# Patient Record
Sex: MALE | Race: OTHER | Employment: OTHER | URBAN - METROPOLITAN AREA
[De-identification: names, ages, dates, MRNs, and addresses within clinical notes are randomized per-mention and may not be internally consistent; named-entity substitution may affect disease eponyms.]

---

## 2017-11-24 ENCOUNTER — HOSPITAL ENCOUNTER (INPATIENT)
Facility: HOSPITAL | Age: 72
LOS: 3 days | Discharge: HOME | DRG: 200 | End: 2017-11-28
Attending: EMERGENCY MEDICINE | Admitting: HOSPITALIST
Payer: COMMERCIAL

## 2017-11-24 ENCOUNTER — APPOINTMENT (OUTPATIENT)
Dept: GENERAL RADIOLOGY | Facility: HOSPITAL | Age: 72
DRG: 200 | End: 2017-11-24
Attending: EMERGENCY MEDICINE
Payer: COMMERCIAL

## 2017-11-24 ENCOUNTER — APPOINTMENT (OUTPATIENT)
Dept: CT IMAGING | Facility: HOSPITAL | Age: 72
DRG: 200 | End: 2017-11-24
Attending: EMERGENCY MEDICINE
Payer: COMMERCIAL

## 2017-11-24 ENCOUNTER — APPOINTMENT (OUTPATIENT)
Dept: CT IMAGING | Facility: HOSPITAL | Age: 72
DRG: 200 | End: 2017-11-24
Payer: COMMERCIAL

## 2017-11-24 DIAGNOSIS — S27.0XXA TRAUMATIC PNEUMOTHORAX, INITIAL ENCOUNTER: ICD-10-CM

## 2017-11-24 DIAGNOSIS — S22.41XA CLOSED FRACTURE OF MULTIPLE RIBS OF RIGHT SIDE, INITIAL ENCOUNTER: Primary | ICD-10-CM

## 2017-11-24 DIAGNOSIS — T79.7XXA SUBCUTANEOUS EMPHYSEMA DUE TO TRAUMA, INITIAL ENCOUNTER (HCC): ICD-10-CM

## 2017-11-24 PROCEDURE — 71250 CT THORAX DX C-: CPT | Performed by: EMERGENCY MEDICINE

## 2017-11-24 PROCEDURE — 71101 X-RAY EXAM UNILAT RIBS/CHEST: CPT | Performed by: EMERGENCY MEDICINE

## 2017-11-24 PROCEDURE — 70450 CT HEAD/BRAIN W/O DYE: CPT | Performed by: EMERGENCY MEDICINE

## 2017-11-24 PROCEDURE — 74176 CT ABD & PELVIS W/O CONTRAST: CPT | Performed by: EMERGENCY MEDICINE

## 2017-11-24 RX ORDER — MORPHINE SULFATE 4 MG/ML
4 INJECTION, SOLUTION INTRAMUSCULAR; INTRAVENOUS ONCE
Status: COMPLETED | OUTPATIENT
Start: 2017-11-24 | End: 2017-11-24

## 2017-11-24 RX ORDER — ONDANSETRON 2 MG/ML
4 INJECTION INTRAMUSCULAR; INTRAVENOUS ONCE
Status: COMPLETED | OUTPATIENT
Start: 2017-11-24 | End: 2017-11-24

## 2017-11-24 RX ORDER — ACETAMINOPHEN 500 MG
1000 TABLET ORAL ONCE
Status: COMPLETED | OUTPATIENT
Start: 2017-11-24 | End: 2017-11-24

## 2017-11-25 ENCOUNTER — APPOINTMENT (OUTPATIENT)
Dept: GENERAL RADIOLOGY | Facility: HOSPITAL | Age: 72
DRG: 200 | End: 2017-11-25
Attending: HOSPITALIST
Payer: COMMERCIAL

## 2017-11-25 PROBLEM — S27.0XXA TRAUMATIC PNEUMOTHORAX, INITIAL ENCOUNTER: Status: ACTIVE | Noted: 2017-11-25

## 2017-11-25 PROBLEM — S22.41XA MULTIPLE CLOSED FRACTURES OF RIBS OF RIGHT SIDE: Status: ACTIVE | Noted: 2017-11-25

## 2017-11-25 PROBLEM — T79.7XXA SUBCUTANEOUS EMPHYSEMA DUE TO TRAUMA, INITIAL ENCOUNTER (HCC): Status: ACTIVE | Noted: 2017-11-25

## 2017-11-25 PROBLEM — S22.41XA CLOSED FRACTURE OF MULTIPLE RIBS OF RIGHT SIDE, INITIAL ENCOUNTER: Status: ACTIVE | Noted: 2017-11-25

## 2017-11-25 PROCEDURE — 71010 XR CHEST AP/PA (1 VIEW) (CPT=71010): CPT | Performed by: HOSPITALIST

## 2017-11-25 PROCEDURE — 99254 IP/OBS CNSLTJ NEW/EST MOD 60: CPT | Performed by: INTERNAL MEDICINE

## 2017-11-25 PROCEDURE — 99233 SBSQ HOSP IP/OBS HIGH 50: CPT | Performed by: HOSPITALIST

## 2017-11-25 PROCEDURE — 99222 1ST HOSP IP/OBS MODERATE 55: CPT | Performed by: HOSPITALIST

## 2017-11-25 RX ORDER — HYDROCODONE BITARTRATE AND ACETAMINOPHEN 5; 325 MG/1; MG/1
2 TABLET ORAL EVERY 6 HOURS PRN
Status: DISCONTINUED | OUTPATIENT
Start: 2017-11-25 | End: 2017-11-28

## 2017-11-25 RX ORDER — SODIUM CHLORIDE AND POTASSIUM CHLORIDE .9; .15 G/100ML; G/100ML
SOLUTION INTRAVENOUS CONTINUOUS
Status: DISCONTINUED | OUTPATIENT
Start: 2017-11-25 | End: 2017-11-28

## 2017-11-25 RX ORDER — PANTOPRAZOLE SODIUM 40 MG/1
40 TABLET, DELAYED RELEASE ORAL
Status: DISCONTINUED | OUTPATIENT
Start: 2017-11-25 | End: 2017-11-28

## 2017-11-25 RX ORDER — HYDRALAZINE HYDROCHLORIDE 20 MG/ML
10 INJECTION INTRAMUSCULAR; INTRAVENOUS EVERY 4 HOURS PRN
Status: DISCONTINUED | OUTPATIENT
Start: 2017-11-25 | End: 2017-11-28

## 2017-11-25 RX ORDER — AMLODIPINE BESYLATE 10 MG/1
10 TABLET ORAL DAILY
Qty: 30 TABLET | Refills: 0 | Status: SHIPPED | OUTPATIENT
Start: 2017-11-26

## 2017-11-25 RX ORDER — LISINOPRIL 2.5 MG/1
2.5 TABLET ORAL DAILY
Status: DISCONTINUED | OUTPATIENT
Start: 2017-11-25 | End: 2017-11-26

## 2017-11-25 RX ORDER — ONDANSETRON 2 MG/ML
4 INJECTION INTRAMUSCULAR; INTRAVENOUS EVERY 6 HOURS PRN
Status: DISCONTINUED | OUTPATIENT
Start: 2017-11-25 | End: 2017-11-28

## 2017-11-25 RX ORDER — HYDROCODONE BITARTRATE AND ACETAMINOPHEN 5; 325 MG/1; MG/1
1 TABLET ORAL EVERY 6 HOURS PRN
Status: DISCONTINUED | OUTPATIENT
Start: 2017-11-25 | End: 2017-11-28

## 2017-11-25 RX ORDER — 0.9 % SODIUM CHLORIDE 0.9 %
VIAL (ML) INJECTION
Status: COMPLETED
Start: 2017-11-25 | End: 2017-11-25

## 2017-11-25 RX ORDER — KETOROLAC TROMETHAMINE 15 MG/ML
15 INJECTION, SOLUTION INTRAMUSCULAR; INTRAVENOUS EVERY 6 HOURS PRN
Status: DISPENSED | OUTPATIENT
Start: 2017-11-25 | End: 2017-11-27

## 2017-11-25 RX ORDER — ACETAMINOPHEN 325 MG/1
650 TABLET ORAL EVERY 6 HOURS PRN
Status: DISCONTINUED | OUTPATIENT
Start: 2017-11-25 | End: 2017-11-28

## 2017-11-25 RX ORDER — HYDROCODONE BITARTRATE AND ACETAMINOPHEN 5; 325 MG/1; MG/1
2 TABLET ORAL EVERY 6 HOURS PRN
Qty: 30 TABLET | Refills: 0 | Status: SHIPPED | OUTPATIENT
Start: 2017-11-25

## 2017-11-25 RX ORDER — POTASSIUM CHLORIDE 20 MEQ/1
40 TABLET, EXTENDED RELEASE ORAL EVERY 4 HOURS
Status: COMPLETED | OUTPATIENT
Start: 2017-11-25 | End: 2017-11-25

## 2017-11-25 RX ORDER — AMLODIPINE BESYLATE 10 MG/1
10 TABLET ORAL DAILY
Status: DISCONTINUED | OUTPATIENT
Start: 2017-11-25 | End: 2017-11-28

## 2017-11-25 NOTE — H&P
Noreen 11 Patient Status:  Emergency    10/15/1945 MRN [id number]   Location [address] Attending  E  Milton Meyer Day # 0 PCP None Pcp     Date: Exam:  Temp:  [97.7 °F (36.5 °C)] 97.7 °F (36.5 °C)  Pulse:  [68-80] 76  Resp:  [18-26] 23  BP: (137-190)/() 154/97    General:  Alert and oriented. Diffuse skin problem:  None.   Eye:  Pupils are equal, round and reactive to light, extraocular movem fracture  Use Vicodin as needed for pain, will use Lidoderm patch as well. Uncontrolled hypertension  Likely precipitated by pain, will treat pain appropriately, resume home medications.     Prophylaxis  Subcutaneous heparin    CODE STATUS  Full    Prima

## 2017-11-25 NOTE — ED INITIAL ASSESSMENT (HPI)
Pt came in for mechanical fall backward in the shower today. Reports right posterior rib pain and posterior head pain from hitting his head. Denies LOC, no blood thinners. Also reports ongoing cough.  RR even and nonlabored, speaking in full sentences, afeb

## 2017-11-25 NOTE — Clinical Note
Date: 11/24/2017  Patient: Mikael Borrego  Admitted: 11/24/2017  6:40 PM  Attending Provider: Joseph Giang*    Transfer to [place] was arranged to provide a higer level of care.  Attending physician at the receiving facility was in agreeme n

## 2017-11-25 NOTE — CM/SW NOTE
Declined by [facility] after several attempts of trying to reach a hospitalist but [employer] refused to give a number to the Hospitalist group that admits to their facility.  Spoke with Dr. Mark Ferraro and she was willing to admit to [place] but Vlad Hamilton

## 2017-11-25 NOTE — PROGRESS NOTES
Pt seen and examined. See H and P from Dr Cristofer Ruano from earlier today. Pain 5/10. Also with HTN uncontrolled. Will add norco and lisinopril for BP. Monitor overnight.      Kay Kumar  11/25/2017

## 2017-11-25 NOTE — CONSULTS
[facility]    Report of Consultation    Angela Sepulveda Patient Status:  Inpatient    10/15/1945 MRN [id number]   Location [facility] 3W/SW Attending Maryln Denver, [address] [facility] Day # 0 PCP None Pcp     Date of Admission:   admission.     Allergies  No Known Allergies    Review of Systems:   Constitutional: denies fevers, chills, weakness, fatigue, recent illness  HEENT: denies headache, sore throat, vision loss  Cardio: denies chest pain, chest pressure, palpitations  Respira 11/24/2017  CONCLUSION:  1. No acute findings. 2. 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 3. Age-related diffuse atrophy and chronic white matter ischemic changes.          Ct Chest+abdomen+pelvis(cpt=71250/23264)    Result Date: standpoint  -Reviewed vitals, labs and imaging    Marifer Avila DO  Pulmonary [address] [place] [address]  11/25/2017  10:00 AM

## 2017-11-26 ENCOUNTER — APPOINTMENT (OUTPATIENT)
Dept: GENERAL RADIOLOGY | Facility: HOSPITAL | Age: 72
DRG: 200 | End: 2017-11-26
Attending: HOSPITALIST
Payer: COMMERCIAL

## 2017-11-26 PROCEDURE — 99233 SBSQ HOSP IP/OBS HIGH 50: CPT | Performed by: HOSPITALIST

## 2017-11-26 PROCEDURE — 74000 XR ABDOMEN (KUB) (1 AP VIEW)  (CPT=74000): CPT | Performed by: HOSPITALIST

## 2017-11-26 PROCEDURE — 99232 SBSQ HOSP IP/OBS MODERATE 35: CPT | Performed by: INTERNAL MEDICINE

## 2017-11-26 PROCEDURE — 71010 XR CHEST AP PORTABLE  (CPT=71010): CPT | Performed by: HOSPITALIST

## 2017-11-26 RX ORDER — DOCUSATE SODIUM 100 MG/1
100 CAPSULE, LIQUID FILLED ORAL DAILY
Status: DISCONTINUED | OUTPATIENT
Start: 2017-11-26 | End: 2017-11-28

## 2017-11-26 RX ORDER — DILTIAZEM HYDROCHLORIDE 5 MG/ML
5 INJECTION INTRAVENOUS ONCE
Status: COMPLETED | OUTPATIENT
Start: 2017-11-26 | End: 2017-11-26

## 2017-11-26 RX ORDER — LISINOPRIL 10 MG/1
10 TABLET ORAL DAILY
Status: DISCONTINUED | OUTPATIENT
Start: 2017-11-26 | End: 2017-11-27

## 2017-11-26 RX ORDER — CODEINE PHOSPHATE AND GUAIFENESIN 10; 100 MG/5ML; MG/5ML
5 SOLUTION ORAL EVERY 4 HOURS PRN
Status: DISCONTINUED | OUTPATIENT
Start: 2017-11-26 | End: 2017-11-28

## 2017-11-26 RX ORDER — 0.9 % SODIUM CHLORIDE 0.9 %
VIAL (ML) INJECTION
Status: COMPLETED
Start: 2017-11-26 | End: 2017-11-26

## 2017-11-26 RX ORDER — LISINOPRIL 10 MG/1
10 TABLET ORAL DAILY
Qty: 30 TABLET | Refills: 0 | Status: SHIPPED | OUTPATIENT
Start: 2017-11-26 | End: 2017-11-28

## 2017-11-26 RX ORDER — METOCLOPRAMIDE HYDROCHLORIDE 5 MG/ML
10 INJECTION INTRAMUSCULAR; INTRAVENOUS EVERY 6 HOURS PRN
Status: DISCONTINUED | OUTPATIENT
Start: 2017-11-26 | End: 2017-11-28

## 2017-11-26 NOTE — CONSULTS
[facility]    Cardiology Consultation    Honorio Frazier Location: [facility] 3W/SW    10/15/1945 MRN [id number]   Consulting Date 2017 [facility] [id number]   Consulting Physician Cally Gonzalez MD Attending Physician Nancy Reyes S1/S2  Abdomen: Soft, nontender, nondistended  Extremities:  No lower extremity edema noted. Without clubbing or cyanosis. Skin: Normal texture and turgor. Neurologic: Alert and oriented x 3. No dysarthria, facial droop, or gross motor deficits.   Psy

## 2017-11-26 NOTE — CM/SW NOTE
The pt. Lives home with his wife and family in a single family home. The pt. Has been independent prior to admission. Plan will be to discharge home when medically stable with no home going needs. The pt's insurance is difficult to staff [facility].   If PT

## 2017-11-26 NOTE — HISTORICAL OFFICE NOTE
[facility]  [phone]  : 10/15/1945  ACCOUNT: [id number]  PCP: Andrés  CARDIOLOGIST: [address]: [facility]  Admitted: 2015  Discharged: 2015    DISCHARGE SUMMARY    HOSPITAL COURSE: The patient came to the ER on

## 2017-11-26 NOTE — PROGRESS NOTES
[place] [facility]    Progress Note    Radha Aguilera Patient Status:  Inpatient    10/15/1945 MRN [id number]   Location [facility] 3W/SW Attending Sallee Dance, [address] [facility] Day # 1 PCP None Pcp       Subjective:   Melody Mcardle 11/26/2017   K 4.2 11/26/2017    11/26/2017   CO2 21 (L) 11/26/2017   GLU 92 11/26/2017   CA 8.3 (L) 11/26/2017   ALB 4.0 11/24/2017   ALKPHO 89 11/24/2017   BILT 1.0 11/24/2017   TP 7.1 11/24/2017   AST 57 (H) 11/24/2017   ALT 45 11/24/2017   LIP 33 Acute fractures of the right eighth, ninth and 10th ribs.        Ekg 12-lead    Result Date: 11/24/2017  ECG Report  Interpretation  -------------------------- Sinus Rhythm WITHIN NORMAL LIMITS When compared with ECG of 08/02/2016 16:16:57 Afib on previous

## 2017-11-26 NOTE — PROGRESS NOTES
[facility]     Progress Note        Jolene Hansen Patient Status:  Inpatient    10/15/1945 MRN [id number]   Location [facility] 3W/SW Attending Debbie Hooks, [address] [facility] [address] Day # 1 PCP None Pcp       Subjective:   Patient see S1 S2  Respiratory: clear to auscultation bilaterally, no wheezing, rales, rhonchi, crackles  GI: abdomen soft, non tender  Extremities: no clubbing, cyanosis, edema  Neurologic: no gross motor deficits  Skin: warm, dry      Results:     Lab Results  Narka correlation. 8. Prostatomegaly measures 6.8 x 5.5 cm. Correlate with PSA levels. 9. Mildly enlarged precarinal lymph node measures 1.4 cm. No other significant adenopathy       Xr Chest Ap Portable  (cpt=71010)    Result Date: 11/26/2017  CONCLUSION:  1.  R

## 2017-11-27 ENCOUNTER — APPOINTMENT (OUTPATIENT)
Dept: CV DIAGNOSTICS | Facility: HOSPITAL | Age: 72
DRG: 200 | End: 2017-11-27
Attending: HOSPITALIST
Payer: COMMERCIAL

## 2017-11-27 PROCEDURE — 99232 SBSQ HOSP IP/OBS MODERATE 35: CPT | Performed by: INTERNAL MEDICINE

## 2017-11-27 PROCEDURE — 93306 TTE W/DOPPLER COMPLETE: CPT | Performed by: HOSPITALIST

## 2017-11-27 PROCEDURE — B246ZZZ ULTRASONOGRAPHY OF RIGHT AND LEFT HEART: ICD-10-PCS | Performed by: INTERNAL MEDICINE

## 2017-11-27 PROCEDURE — 99233 SBSQ HOSP IP/OBS HIGH 50: CPT | Performed by: HOSPITALIST

## 2017-11-27 RX ORDER — POTASSIUM CHLORIDE 20 MEQ/1
40 TABLET, EXTENDED RELEASE ORAL EVERY 4 HOURS
Status: COMPLETED | OUTPATIENT
Start: 2017-11-27 | End: 2017-11-27

## 2017-11-27 RX ORDER — 0.9 % SODIUM CHLORIDE 0.9 %
VIAL (ML) INJECTION
Status: DISPENSED
Start: 2017-11-27 | End: 2017-11-27

## 2017-11-27 RX ORDER — POLYETHYLENE GLYCOL 3350 17 G/17G
17 POWDER, FOR SOLUTION ORAL DAILY PRN
Status: DISCONTINUED | OUTPATIENT
Start: 2017-11-27 | End: 2017-11-28

## 2017-11-27 RX ORDER — LISINOPRIL 20 MG/1
20 TABLET ORAL DAILY
Status: DISCONTINUED | OUTPATIENT
Start: 2017-11-27 | End: 2017-11-28

## 2017-11-27 RX ORDER — BISACODYL 10 MG
10 SUPPOSITORY, RECTAL RECTAL
Status: DISCONTINUED | OUTPATIENT
Start: 2017-11-27 | End: 2017-11-28

## 2017-11-27 RX ORDER — METOPROLOL TARTRATE 50 MG/1
50 TABLET, FILM COATED ORAL
Status: DISCONTINUED | OUTPATIENT
Start: 2017-11-27 | End: 2017-11-28

## 2017-11-27 NOTE — PROGRESS NOTES
[facility] [place]   Progress Note    Yun Cespedes Patient Status:  Inpatient    10/15/1945 MRN [id number]   Location [facility] 3W/SW Attending Ivett Cohn, [address] [facility] Day # 2 PCP None Pcp       Sub echo normal, may discharge from CV perspective   -follow-up in afib clinic in 1 week of discharge  -plan to wait 1 week before starting anticoagulation due to recent trauma, follow-up outpatient  -patient encouraged to monitor BP at home and record and loki

## 2017-11-27 NOTE — PROGRESS NOTES
[facility]    Progress Note    Yun Cespedes Patient Status:  Inpatient    10/15/1945 MRN [id number]   Location [facility] 3W/SW Attending Ivett Cohn, [address] [facility] Day # 2 PCP None Pcp       Subjective:   Dinora Burgess hydrALAzine HCl (APRESOLINE) injection 10 mg 10 mg Intravenous Q4H PRN   HYDROcodone-acetaminophen (NORCO) 5-325 MG per tab 1 tablet 1 tablet Oral Q6H PRN   Or      HYDROcodone-acetaminophen (NORCO) 5-325 MG per tab 2 tablet 2 tablet Oral Q6H PRN GLU  102*   --   92  92   BUN  20   --   7*  4*   CREATSERUM  1.24   --   0.81  0.85   GFRAA  >60   --   >60  >60   GFRNAA  57*   --   >60  >60   CA  8.2*   --   8.3*  8.4*   NA  138   --   139  135*   K  3.6  4.1  4.2  3.6   CL  107   --   109  104   CO

## 2017-11-27 NOTE — PROGRESS NOTES
Pulmonary/Critical Care Follow Up Note    HPI:   Deepa Crowe is a 67year old male with Patient presents with:  Fall (musculoskeletal, neurologic)      PCP None Pcp  Admission Attending Kendell Sahu [facility] Day #2    No cp  No BM  No feve pressure (!) 162/99, pulse 87, temperature 99 °F (37.2 °C), temperature source Oral, resp. rate 20, height 5' 4\" (1.626 m), weight 187 lb 11.2 oz (85.1 kg), SpO2 97 %.     Intake/Output Summary (Last 24 hours) at 11/27/17 5480  Last data filed at 11/27/17

## 2017-11-28 VITALS
BODY MASS INDEX: 32.23 KG/M2 | SYSTOLIC BLOOD PRESSURE: 157 MMHG | HEART RATE: 79 BPM | HEIGHT: 64 IN | WEIGHT: 188.81 LBS | DIASTOLIC BLOOD PRESSURE: 100 MMHG | OXYGEN SATURATION: 93 % | TEMPERATURE: 99 F | RESPIRATION RATE: 16 BRPM

## 2017-11-28 PROCEDURE — 99239 HOSP IP/OBS DSCHRG MGMT >30: CPT | Performed by: HOSPITALIST

## 2017-11-28 RX ORDER — MAGNESIUM OXIDE 400 MG (241.3 MG MAGNESIUM) TABLET
800 TABLET ONCE
Status: DISCONTINUED | OUTPATIENT
Start: 2017-11-28 | End: 2017-11-28

## 2017-11-28 RX ORDER — LISINOPRIL 20 MG/1
20 TABLET ORAL DAILY
Qty: 30 TABLET | Refills: 0 | Status: SHIPPED | OUTPATIENT
Start: 2017-11-29

## 2017-11-28 RX ORDER — METOPROLOL TARTRATE 75 MG/1
75 TABLET, FILM COATED ORAL
Qty: 60 TABLET | Refills: 0 | Status: SHIPPED | OUTPATIENT
Start: 2017-11-28

## 2017-11-28 RX ORDER — MAGNESIUM CARB/ALUMINUM HYDROX 105-160MG
296 TABLET,CHEWABLE ORAL ONCE
Status: COMPLETED | OUTPATIENT
Start: 2017-11-28 | End: 2017-11-28

## 2017-11-28 NOTE — PROGRESS NOTES
[facility]    Progress Note    Deepa Crowe Patient Status:  Inpatient    10/15/1945 MRN [id number]   Location [facility] 3W/SW Attending Mateo Evans, [address] [facility] Day # 3 PCP None Pcp       Subjective:   Ila Cota mg 10 mg Intravenous Q4H PRN   HYDROcodone-acetaminophen (NORCO) 5-325 MG per tab 1 tablet 1 tablet Oral Q6H PRN   Or      HYDROcodone-acetaminophen (NORCO) 5-325 MG per tab 2 tablet 2 tablet Oral Q6H PRN         Lab Results  Component Value Date   WBC 5.6 increase metoprolol today     New onset Afib   - apprec cards consult.  Hold AC for one week given trauma   - echo ok     Ileus - Clear liquid, miralax, minimize narcotics   - ambulate.   - mag citrate     Hypomag - repeat mg level prior to dc      Prophyla

## 2017-11-28 NOTE — PROGRESS NOTES
[facility]   Progress Note    Deepa Crowe Patient Status:  Inpatient    10/15/1945 MRN [id number]   Location [facility] 3W/SW Attending Mateo Evans, [address] [facility] Day # 3 PCP None Pcp       Sub Lisinopril 20mg daily, and Lopressor 50mg BID  -patient encouraged to monitor BP at home  -recheck after morning medications, may need to increase medications further    4) Ileus  -per PCP     Recommendations:  -recheck mag level  -recheck BP and if SBP<16 DO        Kyra Blanco, APN  75/[id number]/[id number]

## 2017-11-28 NOTE — DISCHARGE SUMMARY
[place] [facility]    Discharge Summary    Bianka Lovell Patient Status:  Inpatient    10/15/1945 MRN [id number]   Location [facility] 3W/SW Attending Margie Kumar, [address] [facility] Day # 3 PCP None Pcp     Date of Admission:  male, with past medical history significant for osteoarthritis and hypertension was brought into the ER status post fall. Patient claims he was in the shower when he slipped and fell backwards in the process hitting his head and back.   He now complains of Tabs  Commonly known as:  PRINIVIL,ZESTRIL  Start taking on:  11/29/2017      Take 1 tablet (20 mg total) by mouth daily. Quantity:  30 tablet  Refills:  0     Metoprolol Tartrate 75 MG Tabs      Take 75 mg by mouth 2x Daily(Beta Blocker).    Quantity:  6

## 2017-11-29 ENCOUNTER — TELEPHONE (OUTPATIENT)
Dept: CARDIOLOGY UNIT | Facility: HOSPITAL | Age: 72
End: 2017-11-29

## 2017-11-29 NOTE — PAYOR COMM NOTE
--------------  ADMISSION REVIEW     Payor: Tab Phippselman #:  [id number]  Authorization Number: [id number]    Admit date: 11/25/17  Admit time: 0100   DISCHARGED 11/28/2017      Admitting Physician: Lico William MD  Attending Physician:  Skye kaur Review of Systems   Constitutional: Negative. HENT: Negative. Eyes: Negative. Respiratory: Negative. Cardiovascular: Positive for chest pain (chest wall). Gastrointestinal: Negative. Genitourinary: Negative. Musculoskeletal: Negative. Pulmonary/Chest: Effort normal. No accessory muscle usage. No respiratory distress. He has no decreased breath sounds. He exhibits tenderness (right chest wall mid-axillary line) and bony tenderness.  He exhibits no laceration, no crepitus, no deformity, no Narrative: The following orders were created for panel order CBC WITH DIFFERENTIAL WITH PLATELET.   Procedure                               Abnormality         Status                     ---------                               -----------         ----- PROCEDURE: CT BRAIN OR HEAD (CPT=70450)  COMPARISON: [facility], [facility]/Ron Brink Final, 11/16/2015, 18:55. INDICATIONS: Status post fall in the bathtub. LT sided head trauma.   TECHNIQUE: CT images were obtained without contrast materia PROCEDURE: XR RIBS WITH CHEST (3 VIEWS), RIGHT (CPT=71101)  COMPARISON: None. INDICATIONS: Mid right rib pain post fall today. TECHNIQUE:   Four views.    FINDINGS:   BONES: There are mildly displaced fractures through the posterior lateral right eighth, H&P signed by Edmundo Cabot, MD at 11/25/2017  7:24 AM     Author:  Edmundo Cabot, MD Service:  (none) Author Type:  Physician    Filed:  11/25/2017  7:24 AM Date of Service:  11/25/2017 12:17 AM Status:  Signed    :  Edmundo Cabot, MD ([facility]:  Negative. Ear/Nose/Mouth/Throat:  Negative. Respiratory:  Negative  Cardiovascular: Negative  Gastrointestinal:  Negative. Genitourinary:  Negative  Endocrine:  Negative. Immunologic:  Negative.   Musculoskeletal: Right chest wall pain  Integument BILT 1.0 11/24/2017   TP 7.1 11/24/2017   AST 57 11/24/2017   ALT 45 11/24/2017   LIP 33 11/24/2017[SA.2]       Imaging:  No exam resulted this encounter.     Assessment and Plan:    Right apical traumatic pneumothorax  Pulmonary has been consulted, will co No n/v      Objective:   Blood pressure (!) 179/115, pulse 101, temperature 98.2 °F (36.8 °C), temperature source Oral, resp.  rate 18, height 5' 4\" (1.626 m), weight 187 lb 11.2 oz (85.1 kg), SpO2 97 %.     General appearance: alert, appears stated age an CONCLUSION:  1. Mild cardiomegaly. Tortuous thoracic aorta. 2. Small less than 5% right apical pneumothorax measures 10 mm at the apex. 3. Bibasilar atelectatic changes have developed. Subcutaneous emphysema inferior lateral hemithorax 4.  Acute right poste Results:      CBC:    Lab Results  Component Value Date   WBC 4.4 11/26/2017   WBC 4.4 11/25/2017   WBC 7.0 11/24/2017      Lab Results  Component Value Date   HGB 11.9 (L) 11/26/2017   HGB 12.3 (L) 11/25/2017   HGB 13.8 11/24/2017      Lab Results  Compon Jude Pulse Patient Status:  Inpatient    10/15/1945 MRN [id number]   Location [facility] 3W/SW Attending Eric Shirley, [address] [facility] [address] Day # 2 PCP None Pcp         Subjective:   Jude Pulse is a(n) 67year old male Pain better with hydrALAzine HCl (APRESOLINE) injection 10 mg 10 mg Intravenous Q4H PRN   HYDROcodone-acetaminophen (NORCO) 5-325 MG per tab 1 tablet 1 tablet Oral Q6H PRN   Or         HYDROcodone-acetaminophen (NORCO) 5-325 MG per tab 2 tablet 2 tablet Oral Q6H PRN        1931  11/26/17   0544  11/27/17   0605   GLU  102*   --   92  92   BUN  20   --   7*  4*   CREATSERUM  1.24   --   0.81  0.85   GFRAA  >60   --   >60  >60   GFRNAA  57*   --   >60  >60   CA  8.2*   --   8.3*  8.4*   NA  138   --   139  135*   K  3.6  4.1 Date of Admission: 11/24/2017 Disposition: Final discharge disposition not confirmed         Date of Discharge: 11/28/2017           Admitting Diagnosis: Traumatic pneumothorax, initial encounter [S27. 0XXA]  Subcutaneous emphysema due to trauma, initial en Lenka Narendra kaur(n) 67year old male, with past medical history significant for osteoarthritis and hypertension was brought into the ER status post fall.  Patient claims he was in the shower when he slipped and fell backwards in the process hitting Take 2 tablets by mouth every 6 (six) hours as needed. Quantity:  30 tablet  Refills:  0   lisinopril 20 MG Tabs  Commonly known as:  PRINIVIL,ZESTRIL  Start taking on:  11/29/2017    Take 1 tablet (20 mg total) by mouth daily.  Quantity:  30 tablet  Refi Mavis Khalil is a 67year old who fell in the shower and fractured his ribs and suffered an apical pneumothorax. He is noted to have parox AF and also uncontrolled HTN after admission. He reports chronically elevated BP.   He denies lightheadedness Component Value Date   WBC 4.4 11/26/2017   HGB 11.9 11/26/2017   HCT 35.4 11/26/2017    11/26/2017   CREATSERUM 0.81 11/26/2017   BUN 7 11/26/2017    11/26/2017   K 4.2 11/26/2017    11/26/2017   CO2 21 11/26/2017   GLU 92 11/26/2017 Patient is a 20-year-old  male who presents after recent fall. Patient states that he slipped in his bathtub hitting his right chest and back region. Patient admits to significant right-sided chest discomfort.   States pain is 7 out of 10 in natur : denies dysuria, hematuria  Musculoskeletal: Right Sided chest wall discomfort  Integumentary: denies rash, itching  Neurological: denies syncope, weakness, dizziness,   Psychiatric: denies depression, anxiety  Hematologic: denies bruising           Phy CONCLUSION:  1. Multiple acute right rib fractures involving the right  8th, 9th and 10th ribs. 2. Small right pneumothorax measuring 5% located anterior and medially. 3. Multiple indeterminate right upper lobe pulmonary nodules measure up to 4 mm.  Recomme

## 2017-12-01 ENCOUNTER — TELEPHONE (OUTPATIENT)
Dept: MEDSURG UNIT | Facility: HOSPITAL | Age: 72
End: 2017-12-01

## 2022-03-23 NOTE — PLAN OF CARE
Altered Communication/Language Barrier    • Patient/Family is able to understand and participate in their care Adequate for Discharge        CARDIOVASCULAR - ADULT    • Maintains optimal cardiac output and hemodynamic stability Adequate for Discharge    •
Altered Communication/Language Barrier    • Patient/Family is able to understand and participate in their care Adequate for Discharge        Diabetes/Glucose Control    • Glucose maintained within prescribed range Adequate for Discharge        DISCHARGE PL
Altered Communication/Language Barrier    • Patient/Family is able to understand and participate in their care Adequate for Discharge        Diabetes/Glucose Control    • Glucose maintained within prescribed range Adequate for Discharge        DISCHARGE PL
Altered Communication/Language Barrier    • Patient/Family is able to understand and participate in their care Progressing        CARDIOVASCULAR - ADULT    • Maintains optimal cardiac output and hemodynamic stability Progressing    • Absence of cardiac arr
Altered Communication/Language Barrier    • Patient/Family is able to understand and participate in their care Progressing        Diabetes/Glucose Control    • Glucose maintained within prescribed range Progressing        DISCHARGE PLANNING    • Discharge
Altered Communication/Language Barrier    • Patient/Family is able to understand and participate in their care Progressing        Diabetes/Glucose Control    • Glucose maintained within prescribed range Progressing        DISCHARGE PLANNING    • Discharge
Diabetes/Glucose Control    • Glucose maintained within prescribed range Progressing        Patient Centered Care    • Patient preferences are identified and integrated in the patient's plan of care Progressing        Patient/Family Goals    • Patient/Fami
PAIN - ADULT    • Verbalizes/displays adequate comfort level or patient's stated pain goal Not Progressing          CARDIOVASCULAR - ADULT    • Maintains optimal cardiac output and hemodynamic stability Progressing        DISCHARGE PLANNING    • Discharge
Cold/Sinus

## 2024-07-29 NOTE — ED PROVIDER NOTES
Patient Seen in: [facility] AND [facility] Emergency Department    History   Patient presents with:  Fall (musculoskeletal, neurologic)    Stated Complaint:     HPI    41-year-old male presents for complaint of a fall this morning in the shower.   Patient was try None (Room air)    Current:/97   Pulse 76   Temp 97.7 °F (36.5 °C) (Temporal)   Resp 23   Ht 162.6 cm (5' 4\")   Wt 81.6 kg   SpO2 96%   BMI 30.90 kg/m²         Physical Exam   Constitutional: He is oriented to person, place, and time.  He appears wel Lumbar back: Normal.        Right hand: Normal.        Left hand: Normal.        Right foot: Normal.        Left foot: Normal.   Neurological: He is alert and oriented to person, place, and time. He has normal strength.  No cranial nerve deficit or sensory MDM    EKG with a rate of 78, normal sinus rhythm, normal axis, no STEMI, interpreted by ED physician. Since head CT was unremarkable. No cervical injury, tenderness, deformity. I do not suspect cervical injury.   Chest x-ray revealed multiple displa ORBITS: Limited views are unremarkable. OTHER: Negative. Dictated by (CST): Neymar Reese MD on 11/24/2017 at 20:13     Approved by (CST): Neymar Reese MD on 11/24/2017 at 20:18          CONCLUSION:  1. No acute findings.  2. Chroni of right side S22.41XA 11/25/2017 Unknown 1869